# Patient Record
Sex: FEMALE | Race: WHITE | NOT HISPANIC OR LATINO | URBAN - NONMETROPOLITAN AREA
[De-identification: names, ages, dates, MRNs, and addresses within clinical notes are randomized per-mention and may not be internally consistent; named-entity substitution may affect disease eponyms.]

---

## 2020-08-10 ENCOUNTER — IMPORTED ENCOUNTER (OUTPATIENT)
Dept: URBAN - NONMETROPOLITAN AREA CLINIC 1 | Facility: CLINIC | Age: 65
End: 2020-08-10

## 2020-08-10 PROBLEM — H40.013: Noted: 2020-08-10

## 2020-08-10 PROBLEM — H04.123: Noted: 2021-04-15

## 2020-08-10 PROBLEM — H26.491: Noted: 2021-05-03

## 2020-08-10 PROBLEM — H26.493: Noted: 2020-08-10

## 2020-08-10 PROBLEM — Z96.1: Noted: 2020-08-10

## 2020-08-10 PROBLEM — H40.013: Noted: 2021-05-03

## 2020-08-10 PROBLEM — H40.013: Noted: 2021-04-15

## 2020-08-10 PROBLEM — H26.491: Noted: 2021-04-15

## 2020-08-10 PROCEDURE — 92004 COMPRE OPH EXAM NEW PT 1/>: CPT

## 2020-08-10 PROCEDURE — 92133 CPTRZD OPH DX IMG PST SGM ON: CPT

## 2020-08-10 NOTE — PATIENT DISCUSSION
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 Ricco Bolanos shows Borderline Superior NFL thinning and OS shows No NFL thinning  - Continue to monitorPseudophakia OU with PCO OU- Discussed diagnosis in detail with patient- PCO noted today but stable and no treatment needed at this time - Patient to try OTC 2.00 or +2.25 readers.  She states today that she feels there is a film over her vision halos and trouble with glare - Continue to monitor; 's Notes: OCT 8/10/20

## 2020-09-01 ENCOUNTER — IMPORTED ENCOUNTER (OUTPATIENT)
Dept: URBAN - NONMETROPOLITAN AREA CLINIC 1 | Facility: CLINIC | Age: 65
End: 2020-09-01

## 2020-09-01 PROCEDURE — 99214 OFFICE O/P EST MOD 30 MIN: CPT

## 2020-09-01 NOTE — PATIENT DISCUSSION
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 Kamryn Roque shows Borderline Superior NFL thinning and OS shows No NFL thinning  - VF done today OD shows [place] with Borderline Inferior Nasal defect and OS shows [place] wit hSuperior Nasal Defect - Possible changes in VF may be from CVA patient states 9/1/20 that she had a stroke back in March 20/20 and affected her left side - Continue to monitorPseudophakia OU with PCO OU- Discussed diagnosis in detail with patient- PCO noted today recommend YAG PC eval with Dr Anita Delacruz.  Patient agrees with plan  - BAT done today OD 20/400 and OS 20/LP- If vision does not improve after YAG eval will consider Glaucoma consult  or a consilt with neurologist. Patient agrees with plan - Continue to monitor; 's Notes: OCT 8/10/20

## 2020-09-24 ENCOUNTER — IMPORTED ENCOUNTER (OUTPATIENT)
Dept: URBAN - NONMETROPOLITAN AREA CLINIC 1 | Facility: CLINIC | Age: 65
End: 2020-09-24

## 2020-09-24 PROBLEM — H26.493: Noted: 2020-09-24

## 2020-09-24 PROBLEM — H40.013: Noted: 2020-09-24

## 2020-09-24 PROBLEM — Z96.1: Noted: 2020-08-10

## 2020-09-24 PROCEDURE — 66821 AFTER CATARACT LASER SURGERY: CPT

## 2020-09-24 PROCEDURE — 99214 OFFICE O/P EST MOD 30 MIN: CPT

## 2020-09-24 NOTE — PATIENT DISCUSSION
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 Hedy Joshi shows Borderline Superior NFL thinning and OS shows No NFL thinning  - VF done today OD shows [place] with Borderline Inferior Nasal defect and OS shows [place] wit hSuperior Nasal Defect - Possible changes in VF may be from CVA patient states 9/1/20 that she had a stroke back in March 20/20 and affected her left side - Continue to monitorPseudophakia OU with PCO OU- Discussed diagnosis in detail with patient- PCO noted today recommend YAG PC eval with Dr Bailey Young.  Patient agrees with plan  - BAT done today OD 20/400 and OS 20/LP- If vision does not improve after YAG eval will consider Glaucoma consult  or a consilt with neurologist. Patient agrees with plan - Continue to monitor; 's Notes: OCT 8/10/20

## 2021-04-15 ENCOUNTER — IMPORTED ENCOUNTER (OUTPATIENT)
Dept: URBAN - NONMETROPOLITAN AREA CLINIC 1 | Facility: CLINIC | Age: 66
End: 2021-04-15

## 2021-04-15 PROCEDURE — 99213 OFFICE O/P EST LOW 20 MIN: CPT

## 2021-04-15 NOTE — PATIENT DISCUSSION
DANE GL - Discussed diagnosis in detail with patient- Family history of Glaucoma in mother and father - IOP today 16 OU- Cup to Disc noted at .7 OU- OCT done previously ordered by Dr. Melanie Bennett shows Borderline Superior NFL thinning and OS shows No NFL thinning  - want to repeat this. - VF done previous OD shows [address] with Borderline Inferior Nasal defect and OS shows [place] wit hSuperior Nasal Defect ; I want to repeat this. - Possible changes in VF may be from CVA patient states 9/1/20 that she had a stroke back in March 20/20 and affected her left side . Denies having another CVA has been ruled out by doctors. Pt has been non compliant w/ continuing follow ups. Recommend her continue care w/ new PCP-Dr. Kamini Robbins. - Continue to monitorPseudophakia OU with PCO OD- Discussed diagnosis in detail with patient- PCO OD  noted today recommend YAG PC eval with Dr Mara Bird. Patient agrees with plan  - s/p YAG PC OS w/ open capsule noted. Stable- If vision does not improve after YAG eval will consider Glaucoma consult  or a consult with neurologist. Patient agrees with plan - Continue to monitorDES-Explained DAIN and associated symptoms.-Recommend increasing Omega 3s.-Pt to begin artificial tears OU QID PRN. Pt will contact us if this does not provide relief. Consider punctal plugs in that case. Samples given in office today.; 's Notes: OCT 8/10/20

## 2021-05-03 ENCOUNTER — IMPORTED ENCOUNTER (OUTPATIENT)
Dept: URBAN - NONMETROPOLITAN AREA CLINIC 1 | Facility: CLINIC | Age: 66
End: 2021-05-03

## 2021-05-03 PROBLEM — Z96.1: Noted: 2020-08-10

## 2021-05-03 PROBLEM — H26.491: Noted: 2021-05-03

## 2021-05-03 PROBLEM — H04.123: Noted: 2021-04-15

## 2021-05-03 PROBLEM — H40.013: Noted: 2021-05-03

## 2021-05-03 PROCEDURE — 92014 COMPRE OPH EXAM EST PT 1/>: CPT

## 2021-05-03 PROCEDURE — 66821 AFTER CATARACT LASER SURGERY: CPT

## 2021-05-03 NOTE — PATIENT DISCUSSION
PCO OD:  -Discussed findings w/ pt today-Visually significant with patient complaint of decreased VA-Recommend Yag PC OD today-Explained PCO and RBAs of YAG Capsulotomy to pt. -Pt elects to proceed; consent form was obtained prior to procedure-Thick peripheral cuts no picks-Return in 1-2 weeks for recheck by Delisa-----------------------------------------------------------BL GL - Discussed diagnosis in detail with patient- Family history of Glaucoma in mother and father - IOP today 16 OU- Cup to Disc noted at .7 OU- OCT done previously ordered by Dr. Addy Rea shows Borderline Superior NFL thinning and OS shows No NFL thinning  - want to repeat this. - VF done previous OD shows [address] with Borderline Inferior Nasal defect and OS shows [place] wit hSuperior Nasal Defect ; I want to repeat this. - Possible changes in VF may be from CVA patient states 9/1/20 that she had a stroke back in March 20/20 and affected her left side . Denies having another CVA has been ruled out by doctors. Pt has been non compliant w/ continuing follow ups. Recommend her continue care w/ new PCP-Dr. Brisa Montemayor. - Continue to monitorPseudophakia OU with PCO OD- Discussed diagnosis in detail with patient- PCO OD  noted today recommend YAG PC eval with Dr Gonzalez Suarez. Patient agrees with plan  - s/p YAG PC OS w/ open capsule noted. Stable- If vision does not improve after YAG eval will consider Glaucoma consult  or a consult with neurologist. Patient agrees with plan - Continue to monitorDES-Explained DAIN and associated symptoms.-Recommend increasing Omega 3s.-Pt to begin artificial tears OU QID PRN. Pt will contact us if this does not provide relief. Consider punctal plugs in that case. Samples given in office today.; Dr's Notes: OCT 8/10/20

## 2022-04-15 ASSESSMENT — TONOMETRY
OD_IOP_MMHG: 19
OD_IOP_MMHG: 15
OS_IOP_MMHG: 15
OS_IOP_MMHG: 15
OS_IOP_MMHG: 19
OD_IOP_MMHG: 16
OS_IOP_MMHG: 16
OS_IOP_MMHG: 16

## 2022-04-15 ASSESSMENT — VISUAL ACUITY
OD_CC: 20/40
OS_CC: 20/40-
OD_GLARE: 20/400
OS_CC: 20/50
OD_GLARE: 20/125
OD_GLARE: 20/400
OS_SC: 20/40-
OS_GLARE: 20/400
OS_CC: 20/100-
OD_PH: 20/40
OD_SC: 20/40-
OD_SC: 20/40-
OS_SC: 20/40-
OD_CC: 20/40-
OD_CC: 20/63-2

## 2022-07-01 ENCOUNTER — FOLLOW UP (OUTPATIENT)
Dept: URBAN - NONMETROPOLITAN AREA CLINIC 1 | Facility: CLINIC | Age: 67
End: 2022-07-01

## 2022-07-01 DIAGNOSIS — H40.013: ICD-10-CM

## 2022-07-01 DIAGNOSIS — H52.4: ICD-10-CM

## 2022-07-01 PROCEDURE — 99214 OFFICE O/P EST MOD 30 MIN: CPT

## 2022-07-01 PROCEDURE — 92015 DETERMINE REFRACTIVE STATE: CPT

## 2022-07-01 PROCEDURE — 92133 CPTRZD OPH DX IMG PST SGM ON: CPT

## 2022-07-01 ASSESSMENT — TONOMETRY
OD_IOP_MMHG: 18
OS_IOP_MMHG: 18

## 2022-07-01 ASSESSMENT — VISUAL ACUITY
OD_SC: 20/50-1
OS_SC: 20/60
OD_PH: 20/40-2
OS_PH: 20/50-2

## 2022-07-01 NOTE — PATIENT DISCUSSION
Discussed how the glasses RX will help her some but not get her better to 20/20. Pt understands at this time.

## 2023-08-03 ENCOUNTER — EMERGENCY VISIT (OUTPATIENT)
Dept: URBAN - NONMETROPOLITAN AREA CLINIC 1 | Facility: CLINIC | Age: 68
End: 2023-08-03

## 2023-08-03 DIAGNOSIS — H11.32: ICD-10-CM

## 2023-08-03 PROCEDURE — 99213 OFFICE O/P EST LOW 20 MIN: CPT

## 2023-08-03 ASSESSMENT — VISUAL ACUITY
OU_CC: 20/70
OD_CC: 20/50
OS_CC: 20/60

## 2023-08-03 ASSESSMENT — TONOMETRY
OD_IOP_MMHG: 20
OS_IOP_MMHG: 18

## 2025-05-12 ENCOUNTER — EMERGENCY VISIT (OUTPATIENT)
Age: 70
End: 2025-05-12

## 2025-05-12 DIAGNOSIS — H11.32: ICD-10-CM

## 2025-05-12 PROCEDURE — 99213 OFFICE O/P EST LOW 20 MIN: CPT
